# Patient Record
Sex: MALE | Race: WHITE
[De-identification: names, ages, dates, MRNs, and addresses within clinical notes are randomized per-mention and may not be internally consistent; named-entity substitution may affect disease eponyms.]

---

## 2020-01-06 ENCOUNTER — HOSPITAL ENCOUNTER (OUTPATIENT)
Dept: HOSPITAL 47 - RADMRIMAIN | Age: 40
Discharge: HOME | End: 2020-01-06
Attending: ORTHOPAEDIC SURGERY
Payer: COMMERCIAL

## 2020-01-06 DIAGNOSIS — S83.281A: ICD-10-CM

## 2020-01-06 DIAGNOSIS — S83.241A: Primary | ICD-10-CM

## 2020-01-06 DIAGNOSIS — S83.411A: ICD-10-CM

## 2020-01-06 NOTE — MR
EXAMINATION TYPE: MR knee RT wo con

 

DATE OF EXAM: 1/6/2020

 

COMPARISON: NONE

 

HISTORY: Rt knee pain x 1 month, no trauma

 

TECHNIQUE: 

Multiplanar, multisequence images of the knee is performed without IV contrast.

 

FINDINGS:

 

MEDIAL MENISCUS: Anterior horn is intact without tear. Posterior horn shows horizontal linear increas
ed signal does not extend to articular surface

 

LATERAL MENISCUS: Anterior horn is intact without tear. Rectangular shaped increased signal posterior
 horn does not definitively extend to articular surface but extends into central avascular zone sagit
ray image 26.

 

CRUCIATE LIGAMENTS: The anterior and posterior cruciate ligaments are intact and unremarkable.

 

COLLATERAL LIGAMENTS: The medial collateral ligament and lateral collateral ligament complex are inta
ct. Mild fluid signal surrounds the medial collateral ligament coronal image 19.

 

EXTENSOR MECHANISM: Visualized quadriceps and patellar tendons are intact.

 

EFFUSION: Moderate to large size suprapatellar joint effusion.

 

POPLITEAL CYST:  No popliteal/baker cyst.

 

TRICOMPARTMENT SPACES: Tricompartmental joint space is fairly well-maintained. No significant spurrin
g is seen.

 

CARTILAGE: Tricompartment articular cartilage is preserved. No significant chondromalacia patella.

 

BONE MARROW SIGNAL: No focal abnormal marrow signal is appreciated.

 

OTHER:  No additional significant abnormality is appreciated.

 

IMPRESSION: 

1. Moderate to large-sized suprapatellar joint effusion.

2. Mild MCL sprain injury.

3. Intrasubstance tear posterior horn of medial meniscus.

4. At least intrasubstance tear posterior horn of lateral meniscus extending towards central avascula
r zone.

## 2025-01-20 ENCOUNTER — HOSPITAL ENCOUNTER (OUTPATIENT)
Dept: HOSPITAL 47 - ORWHC2ENDO | Age: 45
Discharge: HOME | End: 2025-01-20
Attending: SURGERY
Payer: COMMERCIAL

## 2025-01-20 VITALS — TEMPERATURE: 96.3 F | RESPIRATION RATE: 16 BRPM

## 2025-01-20 VITALS — BODY MASS INDEX: 41.3 KG/M2

## 2025-01-20 VITALS — SYSTOLIC BLOOD PRESSURE: 118 MMHG | HEART RATE: 72 BPM | DIASTOLIC BLOOD PRESSURE: 76 MMHG

## 2025-01-20 DIAGNOSIS — E78.5: ICD-10-CM

## 2025-01-20 DIAGNOSIS — Z79.899: ICD-10-CM

## 2025-01-20 DIAGNOSIS — Z79.02: ICD-10-CM

## 2025-01-20 DIAGNOSIS — Z12.11: Primary | ICD-10-CM

## 2025-01-20 DIAGNOSIS — K64.8: ICD-10-CM

## 2025-01-20 DIAGNOSIS — E66.01: ICD-10-CM

## 2025-01-20 PROCEDURE — 45378 DIAGNOSTIC COLONOSCOPY: CPT

## 2025-01-20 RX ADMIN — POTASSIUM CHLORIDE ONE MLS: 14.9 INJECTION, SOLUTION INTRAVENOUS at 08:20

## 2025-01-20 NOTE — P.PCN
Date of Procedure: 01/20/25


Preoperative Diagnosis: 


Screening for colon cancer


Postoperative Diagnosis: 


Internal hemorrhoids


Procedure(s) Performed: 


Colonoscopy


Anesthesia: MAC


Surgeon: Julissa Rae


Pathology: none sent


Condition: stable


Disposition: same day


Indications for Procedure: 


44-year-old male presents today for screening colonoscopy.  Denies blood in the 

stool.  No family history of colon cancer.  Risks, benefits and alternatives 

were provided to the patient.  All questions answered.


Operative Findings: 


Overall normal-appearing colon with internal hemorrhoids


Description of Procedure: 


The patient was brought to the endoscopy suite and placed in left lateral de

cubitus position and adequate sedation was achieved using conscious sedation.  

Digital rectal exam was performed and mild internal hemorrhoids were palpated.  

An endoscope was then placed in the rectum and advanced to the cecum as 

identified by landmarks including the appendiceal orifice and the ileocecal 

valve.  The prep was good.  The colonoscope was then slowly withdrawn, examining

for any mucosal abnormalities.  The cecum, ascending, transverse, descending and

sigmoid colon were visualized adequately.  There were no large neoplastic 

lesions noted throughout the colon.  No obvious polyps noted throughout the 

colon.  No evidence of diverticulosis.  Hemostasis was maintained.  Retroflexion

was performed in the rectum and internal hemorrhoids were noted.  Excess air was

removed, the colonoscope withdrawn and the procedure terminated.  The patient 

was then transferred to the recovery unit in stable condition.  Repeat 

colonoscopy should be performed in 8 to 10 years years.